# Patient Record
Sex: MALE | Race: WHITE | NOT HISPANIC OR LATINO | Employment: OTHER | URBAN - METROPOLITAN AREA
[De-identification: names, ages, dates, MRNs, and addresses within clinical notes are randomized per-mention and may not be internally consistent; named-entity substitution may affect disease eponyms.]

---

## 2020-07-23 ENCOUNTER — TRANSCRIBE ORDERS (OUTPATIENT)
Dept: ADMINISTRATIVE | Facility: HOSPITAL | Age: 80
End: 2020-07-23

## 2020-07-23 DIAGNOSIS — M62.08 DIASTASIS RECTI: Primary | ICD-10-CM

## 2020-07-31 ENCOUNTER — HOSPITAL ENCOUNTER (EMERGENCY)
Facility: HOSPITAL | Age: 80
Discharge: HOME | End: 2020-07-31
Attending: EMERGENCY MEDICINE | Admitting: EMERGENCY MEDICINE

## 2020-07-31 ENCOUNTER — APPOINTMENT (OUTPATIENT)
Dept: CT IMAGING | Facility: HOSPITAL | Age: 80
End: 2020-07-31

## 2020-07-31 VITALS
HEIGHT: 69 IN | SYSTOLIC BLOOD PRESSURE: 151 MMHG | DIASTOLIC BLOOD PRESSURE: 84 MMHG | BODY MASS INDEX: 24.29 KG/M2 | WEIGHT: 164 LBS | OXYGEN SATURATION: 96 % | HEART RATE: 69 BPM | RESPIRATION RATE: 18 BRPM | TEMPERATURE: 97.6 F

## 2020-07-31 DIAGNOSIS — R10.13 DYSPEPSIA: Primary | ICD-10-CM

## 2020-07-31 LAB
ALBUMIN SERPL-MCNC: 4.2 G/DL (ref 3.5–5.2)
ALBUMIN/GLOB SERPL: 1.4 G/DL
ALP SERPL-CCNC: 65 U/L (ref 39–117)
ALT SERPL W P-5'-P-CCNC: 13 U/L (ref 1–41)
ANION GAP SERPL CALCULATED.3IONS-SCNC: 6 MMOL/L (ref 5–15)
AST SERPL-CCNC: 18 U/L (ref 1–40)
BASOPHILS # BLD AUTO: 0.04 10*3/MM3 (ref 0–0.2)
BASOPHILS NFR BLD AUTO: 0.7 % (ref 0–1.5)
BILIRUB SERPL-MCNC: 0.9 MG/DL (ref 0–1.2)
BILIRUB UR QL STRIP: NEGATIVE
BUN SERPL-MCNC: 11 MG/DL (ref 8–23)
BUN/CREAT SERPL: 9.4 (ref 7–25)
CALCIUM SPEC-SCNC: 9 MG/DL (ref 8.6–10.5)
CHLORIDE SERPL-SCNC: 103 MMOL/L (ref 98–107)
CLARITY UR: CLEAR
CO2 SERPL-SCNC: 30 MMOL/L (ref 22–29)
COLOR UR: YELLOW
CREAT SERPL-MCNC: 1.17 MG/DL (ref 0.76–1.27)
D-LACTATE SERPL-SCNC: 1.2 MMOL/L (ref 0.5–2)
DEPRECATED RDW RBC AUTO: 41.9 FL (ref 37–54)
EOSINOPHIL # BLD AUTO: 0.37 10*3/MM3 (ref 0–0.4)
EOSINOPHIL NFR BLD AUTO: 6.1 % (ref 0.3–6.2)
ERYTHROCYTE [DISTWIDTH] IN BLOOD BY AUTOMATED COUNT: 12.2 % (ref 12.3–15.4)
GFR SERPL CREATININE-BSD FRML MDRD: 60 ML/MIN/1.73
GLOBULIN UR ELPH-MCNC: 3 GM/DL
GLUCOSE SERPL-MCNC: 106 MG/DL (ref 65–99)
GLUCOSE UR STRIP-MCNC: NEGATIVE MG/DL
HCT VFR BLD AUTO: 45.9 % (ref 37.5–51)
HGB BLD-MCNC: 14.8 G/DL (ref 13–17.7)
HGB UR QL STRIP.AUTO: NEGATIVE
IMM GRANULOCYTES # BLD AUTO: 0.01 10*3/MM3 (ref 0–0.05)
IMM GRANULOCYTES NFR BLD AUTO: 0.2 % (ref 0–0.5)
KETONES UR QL STRIP: NEGATIVE
LEUKOCYTE ESTERASE UR QL STRIP.AUTO: NEGATIVE
LIPASE SERPL-CCNC: 46 U/L (ref 13–60)
LYMPHOCYTES # BLD AUTO: 1.92 10*3/MM3 (ref 0.7–3.1)
LYMPHOCYTES NFR BLD AUTO: 31.7 % (ref 19.6–45.3)
MCH RBC QN AUTO: 30.3 PG (ref 26.6–33)
MCHC RBC AUTO-ENTMCNC: 32.2 G/DL (ref 31.5–35.7)
MCV RBC AUTO: 93.9 FL (ref 79–97)
MONOCYTES # BLD AUTO: 0.65 10*3/MM3 (ref 0.1–0.9)
MONOCYTES NFR BLD AUTO: 10.7 % (ref 5–12)
NEUTROPHILS NFR BLD AUTO: 3.07 10*3/MM3 (ref 1.7–7)
NEUTROPHILS NFR BLD AUTO: 50.6 % (ref 42.7–76)
NITRITE UR QL STRIP: NEGATIVE
NRBC BLD AUTO-RTO: 0 /100 WBC (ref 0–0.2)
PH UR STRIP.AUTO: 7 [PH] (ref 5–8)
PLATELET # BLD AUTO: 160 10*3/MM3 (ref 140–450)
PMV BLD AUTO: 10.8 FL (ref 6–12)
POTASSIUM SERPL-SCNC: 3.7 MMOL/L (ref 3.5–5.2)
PROT SERPL-MCNC: 7.2 G/DL (ref 6–8.5)
PROT UR QL STRIP: NEGATIVE
RBC # BLD AUTO: 4.89 10*6/MM3 (ref 4.14–5.8)
SODIUM SERPL-SCNC: 139 MMOL/L (ref 136–145)
SP GR UR STRIP: 1.02 (ref 1–1.03)
UROBILINOGEN UR QL STRIP: NORMAL
WBC # BLD AUTO: 6.06 10*3/MM3 (ref 3.4–10.8)

## 2020-07-31 PROCEDURE — 25010000002 ONDANSETRON PER 1 MG: Performed by: EMERGENCY MEDICINE

## 2020-07-31 PROCEDURE — 81003 URINALYSIS AUTO W/O SCOPE: CPT | Performed by: EMERGENCY MEDICINE

## 2020-07-31 PROCEDURE — 99284 EMERGENCY DEPT VISIT MOD MDM: CPT

## 2020-07-31 PROCEDURE — 74176 CT ABD & PELVIS W/O CONTRAST: CPT

## 2020-07-31 PROCEDURE — 25010000002 MORPHINE PER 10 MG: Performed by: EMERGENCY MEDICINE

## 2020-07-31 PROCEDURE — 83690 ASSAY OF LIPASE: CPT | Performed by: EMERGENCY MEDICINE

## 2020-07-31 PROCEDURE — 85025 COMPLETE CBC W/AUTO DIFF WBC: CPT | Performed by: EMERGENCY MEDICINE

## 2020-07-31 PROCEDURE — 96374 THER/PROPH/DIAG INJ IV PUSH: CPT

## 2020-07-31 PROCEDURE — 96375 TX/PRO/DX INJ NEW DRUG ADDON: CPT

## 2020-07-31 PROCEDURE — 83605 ASSAY OF LACTIC ACID: CPT | Performed by: EMERGENCY MEDICINE

## 2020-07-31 PROCEDURE — 80053 COMPREHEN METABOLIC PANEL: CPT | Performed by: EMERGENCY MEDICINE

## 2020-07-31 RX ORDER — SODIUM CHLORIDE 0.9 % (FLUSH) 0.9 %
10 SYRINGE (ML) INJECTION AS NEEDED
Status: DISCONTINUED | OUTPATIENT
Start: 2020-07-31 | End: 2020-07-31 | Stop reason: HOSPADM

## 2020-07-31 RX ORDER — PANTOPRAZOLE SODIUM 40 MG/1
40 TABLET, DELAYED RELEASE ORAL DAILY
Qty: 30 TABLET | Refills: 0 | Status: SHIPPED | OUTPATIENT
Start: 2020-07-31 | End: 2020-08-30

## 2020-07-31 RX ORDER — MORPHINE SULFATE 4 MG/ML
4 INJECTION, SOLUTION INTRAMUSCULAR; INTRAVENOUS ONCE
Status: COMPLETED | OUTPATIENT
Start: 2020-07-31 | End: 2020-07-31

## 2020-07-31 RX ORDER — ONDANSETRON 2 MG/ML
4 INJECTION INTRAMUSCULAR; INTRAVENOUS ONCE
Status: COMPLETED | OUTPATIENT
Start: 2020-07-31 | End: 2020-07-31

## 2020-07-31 RX ADMIN — ONDANSETRON 4 MG: 2 INJECTION INTRAMUSCULAR; INTRAVENOUS at 06:50

## 2020-07-31 RX ADMIN — SODIUM CHLORIDE 1000 ML: 9 INJECTION, SOLUTION INTRAVENOUS at 06:49

## 2020-07-31 RX ADMIN — MORPHINE SULFATE 4 MG: 4 INJECTION, SOLUTION INTRAMUSCULAR; INTRAVENOUS at 06:50

## 2020-08-17 ENCOUNTER — OFFICE VISIT (OUTPATIENT)
Dept: GASTROENTEROLOGY | Facility: CLINIC | Age: 80
End: 2020-08-17

## 2020-08-17 ENCOUNTER — HOSPITAL ENCOUNTER (OUTPATIENT)
Dept: CT IMAGING | Facility: HOSPITAL | Age: 80
Discharge: HOME | End: 2020-08-17
Admitting: SURGERY

## 2020-08-17 VITALS
WEIGHT: 163 LBS | SYSTOLIC BLOOD PRESSURE: 177 MMHG | DIASTOLIC BLOOD PRESSURE: 84 MMHG | TEMPERATURE: 98 F | HEART RATE: 86 BPM | BODY MASS INDEX: 24.07 KG/M2

## 2020-08-17 DIAGNOSIS — R10.13 EPIGASTRIC PAIN: Primary | ICD-10-CM

## 2020-08-17 DIAGNOSIS — R93.89 ABNORMAL CAT SCAN: ICD-10-CM

## 2020-08-17 DIAGNOSIS — R11.0 NAUSEA: ICD-10-CM

## 2020-08-17 DIAGNOSIS — K63.5 POLYP OF COLON, UNSPECIFIED PART OF COLON, UNSPECIFIED TYPE: ICD-10-CM

## 2020-08-17 DIAGNOSIS — M62.08 DIASTASIS RECTI: ICD-10-CM

## 2020-08-17 DIAGNOSIS — M62.08 DIASTASIS OF RECTUS ABDOMINIS: ICD-10-CM

## 2020-08-17 PROCEDURE — 99204 OFFICE O/P NEW MOD 45 MIN: CPT | Performed by: INTERNAL MEDICINE

## 2020-08-17 PROCEDURE — 74176 CT ABD & PELVIS W/O CONTRAST: CPT

## 2020-08-17 RX ORDER — DIAZEPAM 5 MG/1
5 TABLET ORAL
COMMUNITY
Start: 2018-10-17 | End: 2021-09-07

## 2020-08-17 RX ORDER — TIMOLOL MALEATE 5 MG/ML
1 SOLUTION OPHTHALMIC DAILY
COMMUNITY

## 2020-08-17 NOTE — PROGRESS NOTES
PCP:  Jah Eddy MD     No referring provider defined for this encounter.    Chief Complaint   Patient presents with   • Heartburn     New pt. GERD, abdominal hernia        HPI   The patient is a 80-year-old who comes in with multiple complaints.  It sounds like he feels that he may have a ventral hernia although that was not confirmed on CAT scan.  He is on pantoprazole.  He does not complain much in the way of reflux but does have some nausea.  He has no vomiting.  His nausea appears to be worse after eating.  He has some upper abdominal discomfort but he does not really describe it as severe pain.  He did go to the ER on 7/23/2020.  He had a CAT scan done at that time.  The CAT scan showed no intra-abdominal process that was thought to be acute.  He had some pleural changes.  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  This was an occupational exposure.  He went and had a repeat CAT scan on 731.  There was extensive colonic diverticulosis but no diverticulitis.  Interestingly, there was one mildly distended loop of small bowel in the upper abdomen which could have represented a low-grade or early obstruction versus a focal mild ileus.  Follow-up was suggested.  He had no appreciable hernia at that time.  There was a lung changes that were seen prior.  He had a nonobstructing right renal calculus.  He had renal cysts as well.  Liver chemistries were normal on 7/31/2020.  His hemoglobin was 14.8 hematocrit 45.9.  His white blood cell count was 6.0 and platelet count 160.  He had copious old records extremely well organized.  It looks like he had an upper endoscopy on 2/7/2019.  This was normal aside from a patulous upper esophagus and some dysmotility.  He had a gastric emptying scan in March 2019 which was normal.  He had a colonoscopy back on 11/17/2017 which showed diverticulosis.  He apparently has a history of polyps.  He had a HIDA scan on 4/11/2017 which  showed a elevated ejection fraction at 96%.  He cannot recall whether this reproduced his symptoms.  I do not see an ultrasound of the right upper quadrant.    No Known Allergies       Current Outpatient Medications:   •  bimatoprost (LUMIGAN) 0.01 % ophthalmic drops, Apply 1 drop to eye(s) as directed by provider., Disp: , Rfl:   •  diazePAM (VALIUM) 5 MG tablet, Take 5 mg by mouth., Disp: , Rfl:   •  pantoprazole (PROTONIX) 40 MG EC tablet, Take 1 tablet by mouth Daily for 30 days., Disp: 30 tablet, Rfl: 0  •  timolol (TIMOPTIC-XR) 0.5 % ophthalmic gel-forming, Apply 1 drop to eye(s) as directed by provider Daily., Disp: , Rfl:      Past Medical History:   Diagnosis Date   • Colon polyp        Past Surgical History:   Procedure Laterality Date   • LUNG DECORTICATION          Social History     Socioeconomic History   • Marital status:      Spouse name: Not on file   • Number of children: Not on file   • Years of education: Not on file   • Highest education level: Not on file   Tobacco Use   • Smoking status: Never Smoker   • Smokeless tobacco: Never Used   Substance and Sexual Activity   • Alcohol use: Never     Frequency: Never   • Sexual activity: Defer        Family History   Problem Relation Age of Onset   • Colon cancer Neg Hx    • Colon polyps Neg Hx         Review of Systems   Constitutional: Negative.    HENT: Negative.    Respiratory: Positive for chest tightness and shortness of breath.    Cardiovascular: Negative.    Gastrointestinal: Positive for abdominal pain, constipation and vomiting.   Endocrine: Negative.    Musculoskeletal: Positive for neck stiffness.   Allergic/Immunologic: Negative.    Neurological: Positive for light-headedness.   Hematological: Negative.    Psychiatric/Behavioral: Negative.         Vitals:    08/17/20 1401   BP: 177/84   Pulse: 86   Temp: 98 °F (36.7 °C)        Physical Exam   General Appearance: Alert, in no acute distress   Head: Normocephalic, without obvious  abnormality, atraumatic   Eyes: Lids and lashes normal, conjunctivae and sclerae normal, no icterus, no pallor, corneas clear, PERRLA   Ears: Ears appear intact with no abnormalities noted   Throat: No oral lesions, no thrush, oral mucosa moist   Neck: No adenopathy, supple, trachea midline, no thyromegaly, no JVD   Lungs: Clear to auscultation,respirations regular, even and unlabored Heart: Regular rhythm and normal rate, normal S1 and S2, no murmur, no gallop, no rub, no click   Chest Wall: Symmetrical respiratory expansion   Abdomen: No masses, no organomegaly, soft non-tender, non-distended, no guarding, no rebound tenderness   Extremities: Moves all extremities well, no edema, no cyanosis, no redness   Skin: No bleeding, bruising or rash   Neurologic: Cranial nerves 2 - 12 grossly intact, no focal deficits     Review of systems was reviewed and positives are noted. All of the remaining review of systems in that system are negative.    Syvlain was seen today for heartburn.    Diagnoses and all orders for this visit:    Epigastric pain  -     US Liver; Future    Nausea    Diastasis of rectus abdominis    Polyp of colon, unspecified part of colon, unspecified type    Abnormal CAT scan    Impressions and plan #1 upper abdominal discomfort and nausea: Certainly it is reasonable to consider an upper endoscopy.  Before ordering that I would like to evaluate his CAT scan.  I would like to see whether that area of small bowel dilation is possibly within reach of a small bowel enteroscope.  If that is the case we could do an upper endoscopy and evaluate that area of the small bowel to be sure there is not any difficulties in that location.  It does seem somewhat of a coincidence that his pain is in the area of his upper intestine where the at least describing dilated segment of small bowel.  I am going to suggest an ultrasound of the right upper quadrant.  I cannot see one in his very detailed records.  Certainly this  could cause nausea and upper abdominal discomfort.    #2 diastases of the rectus muscle: He does not appear to have a ventral hernia.  When he does have is a diastases of the rectus muscle which I explained to him is quite common and we typically do not repair.  He is going to see Dr. Eddy today about this.    #3 history of colon polyps: He is not yet due for reevaluation of the colon.  It seems like a 5-year follow-up was suggested.      #4 abnormal CAT scan: I am going to evaluate the CAT scan with radiology to see where exactly the abnormal loop of small bowel is in relation to his duodenum.    Robert Suarez MD

## 2020-08-18 PROBLEM — R10.13 EPIGASTRIC PAIN: Status: ACTIVE | Noted: 2020-08-18

## 2020-08-18 PROBLEM — R11.0 NAUSEA: Status: ACTIVE | Noted: 2020-08-18

## 2020-08-18 PROBLEM — M62.08 DIASTASIS OF RECTUS ABDOMINIS: Status: ACTIVE | Noted: 2020-08-18

## 2020-08-18 PROBLEM — K63.5 POLYP OF COLON: Status: ACTIVE | Noted: 2020-08-18

## 2020-08-18 PROBLEM — R93.89 ABNORMAL CAT SCAN: Status: ACTIVE | Noted: 2020-08-18

## 2020-08-26 ENCOUNTER — DOCUMENTATION (OUTPATIENT)
Dept: GASTROENTEROLOGY | Facility: CLINIC | Age: 80
End: 2020-08-26

## 2020-08-26 NOTE — PROGRESS NOTES
I did review the CAT scan with the radiologist.  That slightly dilated loop of small bowel did not look particularly significant to the radiologist I evaluated it with.  There was some concern whether there could be some low-grade obstruction there.

## 2020-08-31 ENCOUNTER — HOSPITAL ENCOUNTER (OUTPATIENT)
Dept: ULTRASOUND IMAGING | Facility: HOSPITAL | Age: 80
Discharge: HOME | End: 2020-08-31
Admitting: INTERNAL MEDICINE

## 2020-08-31 DIAGNOSIS — R10.13 EPIGASTRIC PAIN: ICD-10-CM

## 2020-08-31 PROCEDURE — 76705 ECHO EXAM OF ABDOMEN: CPT

## 2020-09-29 ENCOUNTER — OFFICE VISIT (OUTPATIENT)
Dept: GASTROENTEROLOGY | Facility: CLINIC | Age: 80
End: 2020-09-29

## 2020-09-29 VITALS
TEMPERATURE: 97.3 F | DIASTOLIC BLOOD PRESSURE: 80 MMHG | SYSTOLIC BLOOD PRESSURE: 173 MMHG | WEIGHT: 168 LBS | BODY MASS INDEX: 24.81 KG/M2 | HEART RATE: 80 BPM

## 2020-09-29 DIAGNOSIS — M62.08 DIASTASIS OF RECTUS ABDOMINIS: ICD-10-CM

## 2020-09-29 DIAGNOSIS — R10.13 EPIGASTRIC PAIN: Primary | ICD-10-CM

## 2020-09-29 PROCEDURE — 99214 OFFICE O/P EST MOD 30 MIN: CPT | Performed by: INTERNAL MEDICINE

## 2020-09-29 RX ORDER — PANTOPRAZOLE SODIUM 40 MG/1
TABLET, DELAYED RELEASE ORAL
COMMUNITY
Start: 2020-09-24 | End: 2021-09-02 | Stop reason: SDUPTHER

## 2020-09-29 NOTE — PROGRESS NOTES
PCP:  Jah Eddy MD     No referring provider defined for this encounter.    Chief Complaint   Patient presents with   • Follow-up        HPI   Patient is a  80-year-old gentleman who is here for follow-up of his epigastric pain.  He points to his xiphoid process and it seems that the pain is somewhat superficial.  He had an ultrasound 8/31/2020 which showed a cyst in the right kidney and fatty liver.  The gallbladder was thought to be normal.  On 9/5/2020 his hemoglobin was 14.0 and hematocrit 41.7.  His white blood cell count was 6.3 and platelet count 158.  His lipase was 30.  His hepatic function panel was normal.  CAT scan on 7/31/2020 showed diverticulosis without evidence of diverticulitis.  No hernia was seen.  He had a repeat CAT scan in August which did not show any other acute problems although it showed some changes of asbestosis which he knew about, some prostatic enlargement with prostate calcifications as well as some aortic calcifications consistent with plaques.  He had bilateral renal cysts.  The right-sided kidney stone.  We went through his CAT scans line by line and pointed out the areas of abnormality.  Most of these are age-related.  He had degenerative changes in his spine.  He had atherosclerotic changes in his aorta.  He has prostatic hypertrophy with calcifications.  Some of these things were not mentioned on the CAT scan report but were mentioned on outside report at an outside hospital that he had done.  I explained to him that sometimes do not mention all the chronic changes if they do not think they are of clinical importance.     No Known Allergies       Current Outpatient Medications:   •  bimatoprost (LUMIGAN) 0.01 % ophthalmic drops, Apply 1 drop to eye(s) as directed by provider., Disp: , Rfl:   •  diazePAM (VALIUM) 5 MG tablet, Take 5 mg by mouth., Disp: , Rfl:   •  pantoprazole (PROTONIX) 40 MG EC tablet, , Disp: , Rfl:   •  timolol (TIMOPTIC-XR) 0.5 % ophthalmic  gel-forming, Apply 1 drop to eye(s) as directed by provider Daily., Disp: , Rfl:      Past Medical History:   Diagnosis Date   • Colon polyp        Past Surgical History:   Procedure Laterality Date   • LUNG DECORTICATION          Social History     Socioeconomic History   • Marital status:      Spouse name: Not on file   • Number of children: Not on file   • Years of education: Not on file   • Highest education level: Not on file   Tobacco Use   • Smoking status: Never Smoker   • Smokeless tobacco: Never Used   Substance and Sexual Activity   • Alcohol use: Never     Frequency: Never   • Sexual activity: Defer        Family History   Problem Relation Age of Onset   • Colon cancer Neg Hx    • Colon polyps Neg Hx         Review of Systems   Constitutional: Negative.    HENT: Negative for trouble swallowing and voice change.    Gastrointestinal: Positive for abdominal pain.        Vitals:    09/29/20 1453   BP: 173/80   Pulse: 80   Temp: 97.3 °F (36.3 °C)        Physical Exam   General Appearance: Alert, in no acute distress   Head: Normocephalic, without obvious abnormality, atraumatic   Eyes: Lids and lashes normal, conjunctivae and sclerae normal, no icterus, no pallor, corneas clear, PERRLA   Ears: Ears appear intact with no abnormalities noted   Neck: No adenopathy, supple, trachea midline, no thyromegaly, no JVD   Lungs: Even and unlabored breathing   Chest Wall: Symmetrical respiratory expansion   Abdomen: No masses, no organomegaly, soft non-tender, non-distended, no guarding, no rebound tenderness   Extremities: Moves all extremities well, no edema, no cyanosis, no redness   Skin: No bleeding, bruising or rash   Neurologic: Cranial nerves 2 - 12 grossly intact, no focal deficits     Review of systems was reviewed and positives are noted. All of the remaining review of systems in that system are negative.    Sylvain was seen today for follow-up.    Diagnoses and all orders for this visit:    Epigastric  pain    Diastasis of rectus abdominis    Impressions and plan #1 epigastric pain: I think his pain may be related to his xiphoid process.  It is fairly superficial.  I showed him the xiphoid process on the CAT scan.  I do not see any surrounding inflammation but this area can be tender at times.  He does have some discomfort with the eating.  We talked about an upper endoscopy.  He would like to hold off on an upper endoscopy at this point.  He would like a referral to a neurologist as he is wondering if there is a nerve pinched.  I did tell him that I thought there was a high likelihood he had some musculoskeletal etiology and probably a pain management doctor may be able to help us from that standpoint.  He prefers to see a neurologist at first and we did recommend Dr. Juan Pena.    Robert Suarez MD

## 2020-09-30 PROBLEM — R93.89 ABNORMAL CAT SCAN: Status: RESOLVED | Noted: 2020-08-18 | Resolved: 2020-09-30

## 2021-07-22 ENCOUNTER — APPOINTMENT (OUTPATIENT)
Dept: CT IMAGING | Facility: HOSPITAL | Age: 81
End: 2021-07-22

## 2021-07-22 ENCOUNTER — HOSPITAL ENCOUNTER (EMERGENCY)
Facility: HOSPITAL | Age: 81
Discharge: HOME | End: 2021-07-22
Attending: EMERGENCY MEDICINE | Admitting: EMERGENCY MEDICINE

## 2021-07-22 ENCOUNTER — APPOINTMENT (OUTPATIENT)
Dept: GENERAL RADIOLOGY | Facility: HOSPITAL | Age: 81
End: 2021-07-22

## 2021-07-22 VITALS
OXYGEN SATURATION: 96 % | DIASTOLIC BLOOD PRESSURE: 104 MMHG | TEMPERATURE: 98.2 F | SYSTOLIC BLOOD PRESSURE: 171 MMHG | HEART RATE: 78 BPM | HEIGHT: 69 IN | BODY MASS INDEX: 25.18 KG/M2 | RESPIRATION RATE: 20 BRPM | WEIGHT: 170 LBS

## 2021-07-22 DIAGNOSIS — J18.9 PNEUMONIA OF RIGHT MIDDLE LOBE DUE TO INFECTIOUS ORGANISM: Primary | ICD-10-CM

## 2021-07-22 LAB
ALBUMIN SERPL-MCNC: 4.1 G/DL (ref 3.5–5.2)
ALBUMIN/GLOB SERPL: 1.4 G/DL
ALP SERPL-CCNC: 70 U/L (ref 39–117)
ALT SERPL W P-5'-P-CCNC: 16 U/L (ref 1–41)
ANION GAP SERPL CALCULATED.3IONS-SCNC: 9 MMOL/L (ref 5–15)
AST SERPL-CCNC: 18 U/L (ref 1–40)
BASOPHILS # BLD AUTO: 0.03 10*3/MM3 (ref 0–0.2)
BASOPHILS NFR BLD AUTO: 0.5 % (ref 0–1.5)
BILIRUB SERPL-MCNC: 0.6 MG/DL (ref 0–1.2)
BILIRUB UR QL STRIP: NEGATIVE
BUN SERPL-MCNC: 16 MG/DL (ref 8–23)
BUN/CREAT SERPL: 16.5 (ref 7–25)
CALCIUM SPEC-SCNC: 9.1 MG/DL (ref 8.6–10.5)
CHLORIDE SERPL-SCNC: 104 MMOL/L (ref 98–107)
CLARITY UR: CLEAR
CO2 SERPL-SCNC: 27 MMOL/L (ref 22–29)
COLOR UR: YELLOW
CREAT SERPL-MCNC: 0.97 MG/DL (ref 0.76–1.27)
D-LACTATE SERPL-SCNC: 1.1 MMOL/L (ref 0.5–2)
DEPRECATED RDW RBC AUTO: 43.8 FL (ref 37–54)
EOSINOPHIL # BLD AUTO: 0.32 10*3/MM3 (ref 0–0.4)
EOSINOPHIL NFR BLD AUTO: 5.6 % (ref 0.3–6.2)
ERYTHROCYTE [DISTWIDTH] IN BLOOD BY AUTOMATED COUNT: 12.5 % (ref 12.3–15.4)
FLUAV SUBTYP SPEC NAA+PROBE: NOT DETECTED
FLUBV RNA ISLT QL NAA+PROBE: NOT DETECTED
GFR SERPL CREATININE-BSD FRML MDRD: 74 ML/MIN/1.73
GLOBULIN UR ELPH-MCNC: 3 GM/DL
GLUCOSE SERPL-MCNC: 107 MG/DL (ref 65–99)
GLUCOSE UR STRIP-MCNC: NEGATIVE MG/DL
HCT VFR BLD AUTO: 42.5 % (ref 37.5–51)
HGB BLD-MCNC: 13.7 G/DL (ref 13–17.7)
HGB UR QL STRIP.AUTO: NEGATIVE
HOLD SPECIMEN: NORMAL
IMM GRANULOCYTES # BLD AUTO: 0.03 10*3/MM3 (ref 0–0.05)
IMM GRANULOCYTES NFR BLD AUTO: 0.5 % (ref 0–0.5)
KETONES UR QL STRIP: NEGATIVE
LEUKOCYTE ESTERASE UR QL STRIP.AUTO: NEGATIVE
LIPASE SERPL-CCNC: 34 U/L (ref 13–60)
LYMPHOCYTES # BLD AUTO: 1.32 10*3/MM3 (ref 0.7–3.1)
LYMPHOCYTES NFR BLD AUTO: 23 % (ref 19.6–45.3)
MCH RBC QN AUTO: 30.8 PG (ref 26.6–33)
MCHC RBC AUTO-ENTMCNC: 32.2 G/DL (ref 31.5–35.7)
MCV RBC AUTO: 95.5 FL (ref 79–97)
MONOCYTES # BLD AUTO: 0.72 10*3/MM3 (ref 0.1–0.9)
MONOCYTES NFR BLD AUTO: 12.5 % (ref 5–12)
NEUTROPHILS NFR BLD AUTO: 3.32 10*3/MM3 (ref 1.7–7)
NEUTROPHILS NFR BLD AUTO: 57.9 % (ref 42.7–76)
NITRITE UR QL STRIP: NEGATIVE
NRBC BLD AUTO-RTO: 0 /100 WBC (ref 0–0.2)
PH UR STRIP.AUTO: 7 [PH] (ref 5–8)
PLATELET # BLD AUTO: 158 10*3/MM3 (ref 140–450)
PMV BLD AUTO: 10.3 FL (ref 6–12)
POTASSIUM SERPL-SCNC: 4.4 MMOL/L (ref 3.5–5.2)
PROT SERPL-MCNC: 7.1 G/DL (ref 6–8.5)
PROT UR QL STRIP: NEGATIVE
RBC # BLD AUTO: 4.45 10*6/MM3 (ref 4.14–5.8)
SARS-COV-2 RNA PNL SPEC NAA+PROBE: NOT DETECTED
SODIUM SERPL-SCNC: 140 MMOL/L (ref 136–145)
SP GR UR STRIP: 1.02 (ref 1–1.03)
TROPONIN T SERPL-MCNC: <0.01 NG/ML (ref 0–0.03)
UROBILINOGEN UR QL STRIP: NORMAL
WBC # BLD AUTO: 5.74 10*3/MM3 (ref 3.4–10.8)
WHOLE BLOOD HOLD SPECIMEN: NORMAL

## 2021-07-22 PROCEDURE — 85025 COMPLETE CBC W/AUTO DIFF WBC: CPT

## 2021-07-22 PROCEDURE — 84484 ASSAY OF TROPONIN QUANT: CPT | Performed by: EMERGENCY MEDICINE

## 2021-07-22 PROCEDURE — 83690 ASSAY OF LIPASE: CPT | Performed by: EMERGENCY MEDICINE

## 2021-07-22 PROCEDURE — 87636 SARSCOV2 & INF A&B AMP PRB: CPT | Performed by: EMERGENCY MEDICINE

## 2021-07-22 PROCEDURE — 81003 URINALYSIS AUTO W/O SCOPE: CPT | Performed by: EMERGENCY MEDICINE

## 2021-07-22 PROCEDURE — 71275 CT ANGIOGRAPHY CHEST: CPT

## 2021-07-22 PROCEDURE — 74177 CT ABD & PELVIS W/CONTRAST: CPT

## 2021-07-22 PROCEDURE — 0 IOPAMIDOL PER 1 ML: Performed by: EMERGENCY MEDICINE

## 2021-07-22 PROCEDURE — 93005 ELECTROCARDIOGRAM TRACING: CPT | Performed by: EMERGENCY MEDICINE

## 2021-07-22 PROCEDURE — 80053 COMPREHEN METABOLIC PANEL: CPT | Performed by: EMERGENCY MEDICINE

## 2021-07-22 PROCEDURE — 99283 EMERGENCY DEPT VISIT LOW MDM: CPT

## 2021-07-22 PROCEDURE — 83605 ASSAY OF LACTIC ACID: CPT | Performed by: EMERGENCY MEDICINE

## 2021-07-22 RX ORDER — SODIUM CHLORIDE 9 MG/ML
10 INJECTION INTRAVENOUS AS NEEDED
Status: DISCONTINUED | OUTPATIENT
Start: 2021-07-22 | End: 2021-07-22 | Stop reason: HOSPADM

## 2021-07-22 RX ORDER — AMOXICILLIN AND CLAVULANATE POTASSIUM 875; 125 MG/1; MG/1
1 TABLET, FILM COATED ORAL ONCE
Status: COMPLETED | OUTPATIENT
Start: 2021-07-22 | End: 2021-07-22

## 2021-07-22 RX ORDER — AZITHROMYCIN 250 MG/1
250 TABLET, FILM COATED ORAL DAILY
Qty: 6 TABLET | Refills: 0 | OUTPATIENT
Start: 2021-07-22 | End: 2021-09-07

## 2021-07-22 RX ORDER — AMOXICILLIN AND CLAVULANATE POTASSIUM 875; 125 MG/1; MG/1
1 TABLET, FILM COATED ORAL EVERY 12 HOURS
Qty: 14 TABLET | Refills: 0 | Status: SHIPPED | OUTPATIENT
Start: 2021-07-22 | End: 2021-07-29

## 2021-07-22 RX ORDER — AZITHROMYCIN 250 MG/1
500 TABLET, FILM COATED ORAL ONCE
Status: COMPLETED | OUTPATIENT
Start: 2021-07-22 | End: 2021-07-22

## 2021-07-22 RX ADMIN — AZITHROMYCIN MONOHYDRATE 500 MG: 250 TABLET ORAL at 19:54

## 2021-07-22 RX ADMIN — IOPAMIDOL 100 ML: 755 INJECTION, SOLUTION INTRAVENOUS at 17:01

## 2021-07-22 RX ADMIN — AMOXICILLIN AND CLAVULANATE POTASSIUM 1 TABLET: 875; 125 TABLET, FILM COATED ORAL at 19:53

## 2021-07-22 RX ADMIN — SODIUM CHLORIDE 1000 ML: 9 INJECTION, SOLUTION INTRAVENOUS at 16:18

## 2021-07-22 NOTE — DISCHARGE INSTRUCTIONS
Rest, and drink plenty of fluids.    Take antibiotics as prescribed.    Follow-up with primary care physician for recheck within the next week.

## 2021-07-22 NOTE — ED PROVIDER NOTES
Subjective   81-year-old male who presents with the complaint of shortness of breath and generalized fatigue.  The patient states that for the last 2 months he has noticed that his overall energy has been decreased and doing relatively minor activity has been more difficult.  He denies any loss of weight during this time he denies a previous history of cancer.  He states that he was in route between [place] in [facility] when he stopped at a [employer] here in [place].  He states after going in [employer] he felt short of breath, and he felt almost too fatigued to get out of [employer].  He was able to get out of [employer], and has drove him self here for further evaluation.  He reports feeling well at this given time with no current symptoms.  He denies recent fever, bodies, or chills.  No reported chest pain, cough, shortness of breath.  He has been previously vaccinated gets COVID-19.  He does report abdominal pain in the midline of his abdomen for greater than 2 years.  He has been diagnosed with a known rectus diastases which is felt to be contributing to this pain.  He also had an ultrasound of his gallbladder 1 year ago that was negative.  He has seen a GI physician on outpatient basis and reports seeing two general surgeons on outpatient basis.  He reports both general surgeons said they would not operate on his rectus diastases.  The GI physician states that if he had continued upper abdominal pain symptoms he should receive an EGD.  He has not received that at this given time.          Review of Systems   Constitutional: Positive for fatigue. Negative for chills and fever.   HENT: Negative for congestion, ear pain, postnasal drip, sinus pressure and sore throat.    Eyes: Negative for pain, redness and visual disturbance.   Respiratory: Positive for shortness of breath. Negative for cough and chest tightness.    Cardiovascular: Negative for chest pain, palpitations and leg swelling.   Gastrointestinal:  Positive for abdominal pain. Negative for anal bleeding, blood in stool, diarrhea, nausea and vomiting.   Endocrine: Negative for polydipsia and polyuria.   Genitourinary: Negative for difficulty urinating, dysuria, frequency and urgency.   Musculoskeletal: Negative for arthralgias, back pain and neck pain.   Skin: Negative for pallor and rash.   Allergic/Immunologic: Negative for environmental allergies and immunocompromised state.   Neurological: Negative for dizziness, weakness and headaches.   Hematological: Negative for adenopathy.   Psychiatric/Behavioral: Negative for confusion, self-injury and suicidal ideas. The patient is not nervous/anxious.    All other systems reviewed and are negative.      Past Medical History:   Diagnosis Date   • Colon polyp        No Known Allergies    Past Surgical History:   Procedure Laterality Date   • LUNG DECORTICATION         Family History   Problem Relation Age of Onset   • Colon cancer Neg Hx    • Colon polyps Neg Hx        Social History     Socioeconomic History   • Marital status:      Spouse name: Not on file   • Number of children: Not on file   • Years of education: Not on file   • Highest education level: Not on file   Tobacco Use   • Smoking status: Never Smoker   • Smokeless tobacco: Never Used   Substance and Sexual Activity   • Alcohol use: Never   • Sexual activity: Defer           Objective   Physical Exam  Vitals and nursing note reviewed.   Constitutional:       General: He is not in acute distress.     Appearance: Normal appearance. He is well-developed. He is not toxic-appearing or diaphoretic.   HENT:      Head: Normocephalic and atraumatic.      Right Ear: External ear normal.      Left Ear: External ear normal.      Nose: Nose normal.   Eyes:      General: Lids are normal.      Pupils: Pupils are equal, round, and reactive to light.   Neck:      Trachea: No tracheal deviation.   Cardiovascular:      Rate and Rhythm: Normal rate and regular  rhythm.      Pulses: No decreased pulses.      Heart sounds: Normal heart sounds. No murmur heard.   No friction rub. No gallop.    Pulmonary:      Effort: Pulmonary effort is normal. No respiratory distress.      Breath sounds: Examination of the right-lower field reveals rales. Examination of the left-lower field reveals rales. Rales present. No decreased breath sounds, wheezing or rhonchi.       Abdominal:      General: Bowel sounds are normal.      Palpations: Abdomen is soft.      Tenderness: There is no abdominal tenderness. There is no guarding or rebound.       Musculoskeletal:         General: No deformity. Normal range of motion.      Cervical back: Normal range of motion and neck supple.   Lymphadenopathy:      Cervical: No cervical adenopathy.   Skin:     General: Skin is warm and dry.      Findings: No rash.   Neurological:      Mental Status: He is alert and oriented to person, place, and time.      Cranial Nerves: No cranial nerve deficit.      Sensory: No sensory deficit.   Psychiatric:         Speech: Speech normal.         Behavior: Behavior normal.         Thought Content: Thought content normal.         Judgment: Judgment normal.         Procedures           ED Course                                           MDM  Number of Diagnoses or Management Options  Pneumonia of right middle lobe due to infectious organism: new and requires workup  Diagnosis management comments: The patient has remained well-appearing nontoxic and in no acute distress throughout the ER course.    CT scan of the chest reports chronic changes and possible right middle lobe pneumonia.  The patient's oxygen saturations have remained great throughout the ER course.    The patient was discharged with antibiotics in the form of Augmentin and azithromycin.    CT scan of the abdomen pelvis shows no acute abnormalities.    Discharged home appearing well.       Amount and/or Complexity of Data Reviewed  Clinical lab tests: ordered  and reviewed  Tests in the radiology section of CPT®: ordered and reviewed  Review and summarize past medical records: yes  Independent visualization of images, tracings, or specimens: yes        Final diagnoses:   Pneumonia of right middle lobe due to infectious organism       ED Disposition  ED Disposition     ED Disposition Condition Comment    Discharge Stable           No follow-up provider specified.       Medication List      No changes were made to your prescriptions during this visit.          Dung Aleman MD  07/22/21 8864

## 2021-07-24 LAB
QT INTERVAL: 368 MS
QTC INTERVAL: 416 MS

## 2021-09-02 RX ORDER — PANTOPRAZOLE SODIUM 40 MG/1
TABLET, DELAYED RELEASE ORAL
Qty: 90 TABLET | Refills: 3 | Status: SHIPPED | OUTPATIENT
Start: 2021-09-02 | End: 2022-12-23 | Stop reason: SDUPTHER

## 2021-09-07 ENCOUNTER — HOSPITAL ENCOUNTER (EMERGENCY)
Facility: HOSPITAL | Age: 81
Discharge: HOME | End: 2021-09-07
Attending: EMERGENCY MEDICINE | Admitting: EMERGENCY MEDICINE

## 2021-09-07 ENCOUNTER — APPOINTMENT (OUTPATIENT)
Dept: GENERAL RADIOLOGY | Facility: HOSPITAL | Age: 81
End: 2021-09-07

## 2021-09-07 ENCOUNTER — APPOINTMENT (OUTPATIENT)
Dept: ULTRASOUND IMAGING | Facility: HOSPITAL | Age: 81
End: 2021-09-07

## 2021-09-07 ENCOUNTER — APPOINTMENT (OUTPATIENT)
Dept: CT IMAGING | Facility: HOSPITAL | Age: 81
End: 2021-09-07

## 2021-09-07 VITALS
TEMPERATURE: 97.9 F | HEIGHT: 69 IN | OXYGEN SATURATION: 95 % | WEIGHT: 170 LBS | RESPIRATION RATE: 18 BRPM | SYSTOLIC BLOOD PRESSURE: 171 MMHG | HEART RATE: 60 BPM | BODY MASS INDEX: 25.18 KG/M2 | DIASTOLIC BLOOD PRESSURE: 106 MMHG

## 2021-09-07 DIAGNOSIS — R10.13 EPIGASTRIC PAIN: Primary | ICD-10-CM

## 2021-09-07 LAB
ALBUMIN SERPL-MCNC: 4.3 G/DL (ref 3.5–5.2)
ALBUMIN/GLOB SERPL: 1.3 G/DL
ALP SERPL-CCNC: 82 U/L (ref 39–117)
ALT SERPL W P-5'-P-CCNC: 22 U/L (ref 1–41)
ANION GAP SERPL CALCULATED.3IONS-SCNC: 9 MMOL/L (ref 5–15)
AST SERPL-CCNC: 21 U/L (ref 1–40)
BACTERIA UR QL AUTO: NORMAL /HPF
BASOPHILS # BLD AUTO: 0.04 10*3/MM3 (ref 0–0.2)
BASOPHILS NFR BLD AUTO: 0.7 % (ref 0–1.5)
BILIRUB SERPL-MCNC: 1 MG/DL (ref 0–1.2)
BILIRUB UR QL STRIP: NEGATIVE
BUN SERPL-MCNC: 10 MG/DL (ref 8–23)
BUN/CREAT SERPL: 9.5 (ref 7–25)
CALCIUM SPEC-SCNC: 9.2 MG/DL (ref 8.6–10.5)
CHLORIDE SERPL-SCNC: 101 MMOL/L (ref 98–107)
CLARITY UR: CLEAR
CO2 SERPL-SCNC: 27 MMOL/L (ref 22–29)
COLOR UR: YELLOW
CREAT SERPL-MCNC: 1.05 MG/DL (ref 0.76–1.27)
D-LACTATE SERPL-SCNC: 1 MMOL/L (ref 0.5–2)
DEPRECATED RDW RBC AUTO: 41.9 FL (ref 37–54)
EOSINOPHIL # BLD AUTO: 0.31 10*3/MM3 (ref 0–0.4)
EOSINOPHIL NFR BLD AUTO: 5.1 % (ref 0.3–6.2)
ERYTHROCYTE [DISTWIDTH] IN BLOOD BY AUTOMATED COUNT: 12.1 % (ref 12.3–15.4)
FLUAV SUBTYP SPEC NAA+PROBE: NOT DETECTED
FLUBV RNA ISLT QL NAA+PROBE: NOT DETECTED
GFR SERPL CREATININE-BSD FRML MDRD: 68 ML/MIN/1.73
GLOBULIN UR ELPH-MCNC: 3.2 GM/DL
GLUCOSE SERPL-MCNC: 109 MG/DL (ref 65–99)
GLUCOSE UR STRIP-MCNC: NEGATIVE MG/DL
HCT VFR BLD AUTO: 46.5 % (ref 37.5–51)
HGB BLD-MCNC: 14.9 G/DL (ref 13–17.7)
HGB UR QL STRIP.AUTO: NEGATIVE
HOLD SPECIMEN: NORMAL
HYALINE CASTS UR QL AUTO: NORMAL /LPF
IMM GRANULOCYTES # BLD AUTO: 0.02 10*3/MM3 (ref 0–0.05)
IMM GRANULOCYTES NFR BLD AUTO: 0.3 % (ref 0–0.5)
KETONES UR QL STRIP: NEGATIVE
LEUKOCYTE ESTERASE UR QL STRIP.AUTO: ABNORMAL
LIPASE SERPL-CCNC: 100 U/L (ref 13–60)
LYMPHOCYTES # BLD AUTO: 1.8 10*3/MM3 (ref 0.7–3.1)
LYMPHOCYTES NFR BLD AUTO: 29.7 % (ref 19.6–45.3)
MCH RBC QN AUTO: 30.1 PG (ref 26.6–33)
MCHC RBC AUTO-ENTMCNC: 32 G/DL (ref 31.5–35.7)
MCV RBC AUTO: 93.9 FL (ref 79–97)
MONOCYTES # BLD AUTO: 0.76 10*3/MM3 (ref 0.1–0.9)
MONOCYTES NFR BLD AUTO: 12.5 % (ref 5–12)
NEUTROPHILS NFR BLD AUTO: 3.14 10*3/MM3 (ref 1.7–7)
NEUTROPHILS NFR BLD AUTO: 51.7 % (ref 42.7–76)
NITRITE UR QL STRIP: NEGATIVE
NRBC BLD AUTO-RTO: 0 /100 WBC (ref 0–0.2)
PH UR STRIP.AUTO: 7 [PH] (ref 5–8)
PLATELET # BLD AUTO: 186 10*3/MM3 (ref 140–450)
PMV BLD AUTO: 10.3 FL (ref 6–12)
POTASSIUM SERPL-SCNC: 4.3 MMOL/L (ref 3.5–5.2)
PROT SERPL-MCNC: 7.5 G/DL (ref 6–8.5)
PROT UR QL STRIP: NEGATIVE
QT INTERVAL: 380 MS
QTC INTERVAL: 427 MS
RBC # BLD AUTO: 4.95 10*6/MM3 (ref 4.14–5.8)
RBC # UR: NORMAL /HPF
REF LAB TEST METHOD: NORMAL
SARS-COV-2 RNA PNL SPEC NAA+PROBE: NOT DETECTED
SODIUM SERPL-SCNC: 137 MMOL/L (ref 136–145)
SP GR UR STRIP: 1.02 (ref 1–1.03)
SQUAMOUS #/AREA URNS HPF: NORMAL /HPF
TROPONIN T SERPL-MCNC: <0.01 NG/ML (ref 0–0.03)
UROBILINOGEN UR QL STRIP: ABNORMAL
WBC # BLD AUTO: 6.07 10*3/MM3 (ref 3.4–10.8)
WBC UR QL AUTO: NORMAL /HPF
WHOLE BLOOD HOLD SPECIMEN: NORMAL
WHOLE BLOOD HOLD SPECIMEN: NORMAL

## 2021-09-07 PROCEDURE — 74176 CT ABD & PELVIS W/O CONTRAST: CPT

## 2021-09-07 PROCEDURE — 76705 ECHO EXAM OF ABDOMEN: CPT

## 2021-09-07 PROCEDURE — 83605 ASSAY OF LACTIC ACID: CPT | Performed by: PHYSICIAN ASSISTANT

## 2021-09-07 PROCEDURE — 71045 X-RAY EXAM CHEST 1 VIEW: CPT

## 2021-09-07 PROCEDURE — 87636 SARSCOV2 & INF A&B AMP PRB: CPT | Performed by: PHYSICIAN ASSISTANT

## 2021-09-07 PROCEDURE — 83690 ASSAY OF LIPASE: CPT | Performed by: EMERGENCY MEDICINE

## 2021-09-07 PROCEDURE — 85025 COMPLETE CBC W/AUTO DIFF WBC: CPT | Performed by: EMERGENCY MEDICINE

## 2021-09-07 PROCEDURE — 84484 ASSAY OF TROPONIN QUANT: CPT | Performed by: EMERGENCY MEDICINE

## 2021-09-07 PROCEDURE — 80053 COMPREHEN METABOLIC PANEL: CPT | Performed by: EMERGENCY MEDICINE

## 2021-09-07 PROCEDURE — 96361 HYDRATE IV INFUSION ADD-ON: CPT

## 2021-09-07 PROCEDURE — 99283 EMERGENCY DEPT VISIT LOW MDM: CPT

## 2021-09-07 PROCEDURE — 93005 ELECTROCARDIOGRAM TRACING: CPT | Performed by: EMERGENCY MEDICINE

## 2021-09-07 PROCEDURE — 81001 URINALYSIS AUTO W/SCOPE: CPT | Performed by: EMERGENCY MEDICINE

## 2021-09-07 PROCEDURE — 96360 HYDRATION IV INFUSION INIT: CPT

## 2021-09-07 RX ORDER — SODIUM CHLORIDE 9 MG/ML
125 INJECTION, SOLUTION INTRAVENOUS CONTINUOUS
Status: DISCONTINUED | OUTPATIENT
Start: 2021-09-07 | End: 2021-09-07 | Stop reason: HOSPADM

## 2021-09-07 RX ORDER — DICYCLOMINE HYDROCHLORIDE 10 MG/1
10 CAPSULE ORAL 4 TIMES DAILY PRN
Qty: 12 CAPSULE | Refills: 0 | Status: SHIPPED | OUTPATIENT
Start: 2021-09-07

## 2021-09-07 RX ORDER — ALUMINA, MAGNESIA, AND SIMETHICONE 2400; 2400; 240 MG/30ML; MG/30ML; MG/30ML
15 SUSPENSION ORAL ONCE
Status: COMPLETED | OUTPATIENT
Start: 2021-09-07 | End: 2021-09-07

## 2021-09-07 RX ORDER — SODIUM CHLORIDE 0.9 % (FLUSH) 0.9 %
10 SYRINGE (ML) INJECTION AS NEEDED
Status: DISCONTINUED | OUTPATIENT
Start: 2021-09-07 | End: 2021-09-07 | Stop reason: HOSPADM

## 2021-09-07 RX ORDER — LIDOCAINE HYDROCHLORIDE 20 MG/ML
15 SOLUTION OROPHARYNGEAL ONCE
Status: COMPLETED | OUTPATIENT
Start: 2021-09-07 | End: 2021-09-07

## 2021-09-07 RX ADMIN — LIDOCAINE HYDROCHLORIDE 15 ML: 20 SOLUTION ORAL; TOPICAL at 14:12

## 2021-09-07 RX ADMIN — SODIUM CHLORIDE 125 ML/HR: 9 INJECTION, SOLUTION INTRAVENOUS at 08:01

## 2021-09-07 RX ADMIN — ALUMINUM HYDROXIDE, MAGNESIUM HYDROXIDE, AND DIMETHICONE 15 ML: 400; 400; 40 SUSPENSION ORAL at 14:12

## 2021-09-07 NOTE — ED PROVIDER NOTES
Subjective   81-year-old male comes emerged on today with epigastric discomfort scheduled on for 2 to 3 days.  He had nausea but no vomiting.  He has had no shortness of breath or cough.  He reports that he has had no trauma to the abdomen he had no melena hematochezia hematemesis.  No fevers or chills no shortness of breath or cough.  No prior history of gallbladder disease but is told in the past that he may have gallbladder disease.      History provided by:  Patient   used: No    Abdominal Pain  Pain location:  Epigastric  Pain quality: cramping and dull    Pain radiates to:  Does not radiate  Pain severity:  Moderate  Onset quality:  Gradual  Duration:  3 days  Timing:  Intermittent  Progression:  Waxing and waning  Chronicity:  Recurrent  Context: not alcohol use, not diet changes, not eating, not laxative use, not previous surgeries, not recent travel, not retching, not sick contacts and not trauma    Relieved by:  Nothing  Worsened by:  Nothing  Ineffective treatments:  None tried  Associated symptoms: nausea    Associated symptoms: no anorexia, no chest pain, no chills, no constipation, no cough, no diarrhea, no fever, no hematemesis, no hematochezia, no hematuria and no vomiting    Risk factors: no alcohol abuse, no aspirin use, not elderly, has not had multiple surgeries, no NSAID use and not obese        Review of Systems   Constitutional: Negative for chills and fever.   Respiratory: Negative for cough, chest tightness and wheezing.    Cardiovascular: Negative for chest pain and palpitations.   Gastrointestinal: Positive for abdominal pain and nausea. Negative for anorexia, constipation, diarrhea, hematemesis, hematochezia and vomiting.   Genitourinary: Negative for hematuria.   Musculoskeletal: Negative for back pain and neck pain.   Skin: Negative for pallor and rash.   Psychiatric/Behavioral: Negative.    All other systems reviewed and are negative.      Past Medical History:    Diagnosis Date   • Colon polyp    • GERD (gastroesophageal reflux disease)        No Known Allergies    Past Surgical History:   Procedure Laterality Date   • LUNG DECORTICATION         Family History   Problem Relation Age of Onset   • Colon cancer Neg Hx    • Colon polyps Neg Hx        Social History     Socioeconomic History   • Marital status:      Spouse name: Not on file   • Number of children: Not on file   • Years of education: Not on file   • Highest education level: Not on file   Tobacco Use   • Smoking status: Never Smoker   • Smokeless tobacco: Never Used   Substance and Sexual Activity   • Alcohol use: Never   • Sexual activity: Defer           Objective   Physical Exam  Vitals and nursing note reviewed.   Constitutional:       Appearance: He is well-developed.   HENT:      Head: Normocephalic and atraumatic.      Right Ear: External ear normal.      Left Ear: External ear normal.      Nose: Nose normal.   Eyes:      General: No scleral icterus.     Conjunctiva/sclera: Conjunctivae normal.      Pupils: Pupils are equal, round, and reactive to light.   Neck:      Thyroid: No thyromegaly.   Cardiovascular:      Rate and Rhythm: Normal rate and regular rhythm.      Heart sounds: Normal heart sounds.   Pulmonary:      Effort: Pulmonary effort is normal. No respiratory distress.      Breath sounds: Normal breath sounds. No wheezing or rales.   Chest:      Chest wall: No tenderness.   Abdominal:      General: Bowel sounds are normal. There is no distension.      Palpations: Abdomen is soft.      Tenderness: There is abdominal tenderness in the epigastric area. There is no right CVA tenderness, left CVA tenderness, guarding or rebound. Negative signs include Keith's sign, Rovsing's sign, McBurney's sign, psoas sign and obturator sign.      Hernia: No hernia is present.      Comments: Normoactive bowel sounds in all 4 quadrants.  No guarding rebound or rigidity.  No pulsatile mass.   Musculoskeletal:          General: Normal range of motion.      Cervical back: Normal range of motion.   Lymphadenopathy:      Cervical: No cervical adenopathy.   Skin:     General: Skin is warm and dry.   Neurological:      Mental Status: He is alert and oriented to person, place, and time.      Cranial Nerves: No cranial nerve deficit.      Coordination: Coordination normal.      Deep Tendon Reflexes: Reflexes are normal and symmetric. Reflexes normal.   Psychiatric:         Behavior: Behavior normal.         Thought Content: Thought content normal.         Judgment: Judgment normal.         Procedures           ED Course  ED Course as of Sep 07 1616   Tue Sep 07, 2021   1304 COVID19: Not Detected [RS]      ED Course User Index  [RS] Rashid Bhakta MD                                 Recent Results (from the past 24 hour(s))   ECG 12 Lead    Collection Time: 09/07/21  7:53 AM   Result Value Ref Range    QT Interval 380 ms    QTC Interval 427 ms   Comprehensive Metabolic Panel    Collection Time: 09/07/21  7:56 AM    Specimen: Blood   Result Value Ref Range    Glucose 109 (H) 65 - 99 mg/dL    BUN 10 8 - 23 mg/dL    Creatinine 1.05 0.76 - 1.27 mg/dL    Sodium 137 136 - 145 mmol/L    Potassium 4.3 3.5 - 5.2 mmol/L    Chloride 101 98 - 107 mmol/L    CO2 27.0 22.0 - 29.0 mmol/L    Calcium 9.2 8.6 - 10.5 mg/dL    Total Protein 7.5 6.0 - 8.5 g/dL    Albumin 4.30 3.50 - 5.20 g/dL    ALT (SGPT) 22 1 - 41 U/L    AST (SGOT) 21 1 - 40 U/L    Alkaline Phosphatase 82 39 - 117 U/L    Total Bilirubin 1.0 0.0 - 1.2 mg/dL    eGFR Non African Amer 68 >60 mL/min/1.73    Globulin 3.2 gm/dL    A/G Ratio 1.3 g/dL    BUN/Creatinine Ratio 9.5 7.0 - 25.0    Anion Gap 9.0 5.0 - 15.0 mmol/L   Lipase    Collection Time: 09/07/21  7:56 AM    Specimen: Blood   Result Value Ref Range    Lipase 100 (H) 13 - 60 U/L   Troponin    Collection Time: 09/07/21  7:56 AM    Specimen: Blood   Result Value Ref Range    Troponin T <0.010 0.000 - 0.030 ng/mL   Green Top  (Gel)    Collection Time: 09/07/21  7:56 AM   Result Value Ref Range    Extra Tube Hold for add-ons.    Lavender Top    Collection Time: 09/07/21  7:56 AM   Result Value Ref Range    Extra Tube hold for add-on    Gold Top - SST    Collection Time: 09/07/21  7:56 AM   Result Value Ref Range    Extra Tube Hold for add-ons.    Gray Top    Collection Time: 09/07/21  7:56 AM   Result Value Ref Range    Extra Tube Hold for add-ons.    Light Blue Top    Collection Time: 09/07/21  7:56 AM   Result Value Ref Range    Extra Tube hold for add-on    CBC Auto Differential    Collection Time: 09/07/21  7:56 AM    Specimen: Blood   Result Value Ref Range    WBC 6.07 3.40 - 10.80 10*3/mm3    RBC 4.95 4.14 - 5.80 10*6/mm3    Hemoglobin 14.9 13.0 - 17.7 g/dL    Hematocrit 46.5 37.5 - 51.0 %    MCV 93.9 79.0 - 97.0 fL    MCH 30.1 26.6 - 33.0 pg    MCHC 32.0 31.5 - 35.7 g/dL    RDW 12.1 (L) 12.3 - 15.4 %    RDW-SD 41.9 37.0 - 54.0 fl    MPV 10.3 6.0 - 12.0 fL    Platelets 186 140 - 450 10*3/mm3    Neutrophil % 51.7 42.7 - 76.0 %    Lymphocyte % 29.7 19.6 - 45.3 %    Monocyte % 12.5 (H) 5.0 - 12.0 %    Eosinophil % 5.1 0.3 - 6.2 %    Basophil % 0.7 0.0 - 1.5 %    Immature Grans % 0.3 0.0 - 0.5 %    Neutrophils, Absolute 3.14 1.70 - 7.00 10*3/mm3    Lymphocytes, Absolute 1.80 0.70 - 3.10 10*3/mm3    Monocytes, Absolute 0.76 0.10 - 0.90 10*3/mm3    Eosinophils, Absolute 0.31 0.00 - 0.40 10*3/mm3    Basophils, Absolute 0.04 0.00 - 0.20 10*3/mm3    Immature Grans, Absolute 0.02 0.00 - 0.05 10*3/mm3    nRBC 0.0 0.0 - 0.2 /100 WBC   Lactic Acid, Plasma    Collection Time: 09/07/21  7:56 AM    Specimen: Blood   Result Value Ref Range    Lactate 1.0 0.5 - 2.0 mmol/L   COVID-19 and FLU A/B PCR - Swab, Nasopharynx    Collection Time: 09/07/21  8:58 AM    Specimen: Nasopharynx; Swab   Result Value Ref Range    COVID19 Not Detected Not Detected - Ref. Range    Influenza A PCR Not Detected Not Detected    Influenza B PCR Not Detected Not Detected    Urinalysis With Microscopic If Indicated (No Culture) - Urine, Clean Catch    Collection Time: 09/07/21  9:10 AM    Specimen: Urine, Clean Catch   Result Value Ref Range    Color, UA Yellow Yellow, Straw    Appearance, UA Clear Clear    pH, UA 7.0 5.0 - 8.0    Specific Gravity, UA 1.019 1.001 - 1.030    Glucose, UA Negative Negative    Ketones, UA Negative Negative    Bilirubin, UA Negative Negative    Blood, UA Negative Negative    Protein, UA Negative Negative    Leuk Esterase, UA Trace (A) Negative    Nitrite, UA Negative Negative    Urobilinogen, UA 1.0 E.U./dL 0.2 - 1.0 E.U./dL   Urinalysis, Microscopic Only - Urine, Clean Catch    Collection Time: 09/07/21  9:10 AM    Specimen: Urine, Clean Catch   Result Value Ref Range    RBC, UA 0-2 None Seen, 0-2 /HPF    WBC, UA 0-2 None Seen, 0-2 /HPF    Bacteria, UA None Seen None Seen, Trace /HPF    Squamous Epithelial Cells, UA None Seen None Seen, 0-2 /HPF    Hyaline Casts, UA 0-6 0 - 6 /LPF    Methodology Automated Microscopy      Note: In addition to lab results from this visit, the labs listed above may include labs taken at another facility or during a different encounter within the last 24 hours. Please correlate lab times with ED admission and discharge times for further clarification of the services performed during this visit.    US Gallbladder   Final Result   Mild diffuse hepatic steatosis. No acute findings otherwise.   Normal gallbladder without evidence of stones or cholecystitis.       This report was finalized on 9/7/2021 1:40 PM by Rupert Ariza.          CT Abdomen Pelvis Without Contrast   Final Result   Stable appearance of the abdomen and pelvis from comparison,   including fairly extensive colonic diverticulosis change without   specific inflammatory signs of diverticulitis. There is no evidence of   mechanical bowel obstruction and the appendix is normal. There are no   acute findings in the abdomen and pelvis.           This report was  finalized on 9/7/2021 8:53 AM by Rupert Ariza.          XR Chest 1 View   Final Result   Fairly extensive chronic changes of the lung fields without   definite acute cardiopulmonary abnormality.       This report was finalized on 9/7/2021 8:45 AM by Rupert Ariza.          NM HIDA scan with pharmacological intervention    (Results Pending)     Vitals:    09/07/21 1200 09/07/21 1230 09/07/21 1300 09/07/21 1416   BP: 136/80 130/76 126/83 (!) 171/106   Pulse:  60     Resp:       Temp:       TempSrc:       SpO2: 96% 95% 94% 95%   Weight:       Height:         Medications   sodium chloride 0.9 % flush 10 mL (has no administration in time range)   sodium chloride 0.9 % infusion (0 mL/hr Intravenous Stopped 9/7/21 1424)   aluminum-magnesium hydroxide-simethicone (MAALOX MAX) 400-400-40 MG/5ML suspension 15 mL (15 mL Oral Given 9/7/21 1412)   Lidocaine Viscous HCl (XYLOCAINE) 2 % mouth solution 15 mL (15 mL Mouth/Throat Given 9/7/21 1412)     ECG/EMG Results (last 24 hours)     Procedure Component Value Units Date/Time    ECG 12 Lead [[id number]] Collected: 09/07/21 0753     Updated: 09/07/21 0808        ECG 12 Lead   Final Result   Test Reason : Upper Abdominal Pain Triage Protocol   Blood Pressure :   */*   mmHG   Vent. Rate :  76 BPM     Atrial Rate :  76 BPM      P-R Int : 174 ms          QRS Dur :  92 ms       QT Int : 380 ms       P-R-T Axes :  65 -15  33 degrees      QTc Int : 427 ms      Normal sinus rhythm   Incomplete right bundle branch block   Borderline ECG   When compared with ECG of 22-JUL-2021 17:49,   No significant change was found   Confirmed by TOO RUBI MD ([id number]) on 9/7/2021 2:28:50 PM      Referred By: EDMD           Confirmed By: TOO RUBI MD                    MDM  Number of Diagnoses or Management Options  Epigastric pain: new and requires workup     Amount and/or Complexity of Data Reviewed  Clinical lab tests: reviewed and ordered  Tests in the radiology section of CPT®: reviewed and  ordered  Tests in the medicine section of CPT®: ordered and reviewed  Discuss the patient with other providers: yes    Patient Progress  Patient progress: stable      Final diagnoses:   Epigastric pain       ED Disposition  ED Disposition     ED Disposition Condition Comment    Discharge Stable           Robert Suarez MD  [address] [id number] [phone]               Medication List      New Prescriptions    dicyclomine 10 MG capsule  Commonly known as: BENTYL  Take 1 capsule by mouth 4 (Four) Times a Day As Needed (pain).        Stop    azithromycin 250 MG tablet  Commonly known as: ZITHROMAX     diazePAM 5 MG tablet  Commonly known as: VALIUM           Where to Get Your Medications      These medications were sent to [facility] [address] [place] [address] - [address] [facility] - [phone]  - [phone] [address] [address]    Phone: [phone]   · dicyclomine 10 MG capsule          Garcia Vaughan PA  09/07/21 0014

## 2021-10-15 ENCOUNTER — HOSPITAL ENCOUNTER (OUTPATIENT)
Dept: NUCLEAR MEDICINE | Facility: HOSPITAL | Age: 81
Discharge: HOME | End: 2021-10-15

## 2021-10-15 VITALS — BODY MASS INDEX: 25.1 KG/M2 | WEIGHT: 170 LBS

## 2021-10-15 DIAGNOSIS — R10.13 EPIGASTRIC PAIN: ICD-10-CM

## 2021-10-15 PROCEDURE — A9537 TC99M MEBROFENIN: HCPCS | Performed by: PHYSICIAN ASSISTANT

## 2021-10-15 PROCEDURE — 0 TECHNETIUM TC 99M MEBROFENIN KIT: Performed by: PHYSICIAN ASSISTANT

## 2021-10-15 PROCEDURE — 78227 HEPATOBIL SYST IMAGE W/DRUG: CPT

## 2021-10-15 PROCEDURE — 25010000002 SINCALIDE PER 5 MCG: Performed by: PHYSICIAN ASSISTANT

## 2021-10-15 RX ORDER — KIT FOR THE PREPARATION OF TECHNETIUM TC 99M MEBROFENIN 45 MG/10ML
1 INJECTION, POWDER, LYOPHILIZED, FOR SOLUTION INTRAVENOUS
Status: COMPLETED | OUTPATIENT
Start: 2021-10-15 | End: 2021-10-15

## 2021-10-15 RX ADMIN — MEBROFENIN 1 DOSE: 45 INJECTION, POWDER, LYOPHILIZED, FOR SOLUTION INTRAVENOUS at 10:35

## 2021-10-15 RX ADMIN — SINCALIDE 1.5 MCG: 5 INJECTION, POWDER, LYOPHILIZED, FOR SOLUTION INTRAVENOUS at 11:30

## 2021-10-18 ENCOUNTER — APPOINTMENT (OUTPATIENT)
Dept: NUCLEAR MEDICINE | Facility: HOSPITAL | Age: 81
End: 2021-10-18

## 2022-12-23 RX ORDER — PANTOPRAZOLE SODIUM 40 MG/1
TABLET, DELAYED RELEASE ORAL
Qty: 90 TABLET | Refills: 3 | Status: SHIPPED | OUTPATIENT
Start: 2022-12-23